# Patient Record
Sex: MALE | Race: WHITE | NOT HISPANIC OR LATINO | URBAN - METROPOLITAN AREA
[De-identification: names, ages, dates, MRNs, and addresses within clinical notes are randomized per-mention and may not be internally consistent; named-entity substitution may affect disease eponyms.]

---

## 2018-12-19 ENCOUNTER — OUTPATIENT (OUTPATIENT)
Dept: OUTPATIENT SERVICES | Facility: HOSPITAL | Age: 63
LOS: 1 days | Discharge: HOME | End: 2018-12-19
Payer: COMMERCIAL

## 2018-12-19 DIAGNOSIS — S91.309A UNSPECIFIED OPEN WOUND, UNSPECIFIED FOOT, INITIAL ENCOUNTER: ICD-10-CM
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
## 2018-12-20 DIAGNOSIS — E66.8 OTHER OBESITY: ICD-10-CM

## 2018-12-20 DIAGNOSIS — I10 ESSENTIAL (PRIMARY) HYPERTENSION: ICD-10-CM
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
## 2018-12-20 DIAGNOSIS — S91.002D UNSPECIFIED OPEN WOUND, LEFT ANKLE, SUBSEQUENT ENCOUNTER: ICD-10-CM
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
## 2018-12-20 DIAGNOSIS — Z79.899 OTHER LONG TERM (CURRENT) DRUG THERAPY: ICD-10-CM

## 2018-12-20 DIAGNOSIS — N40.0 BENIGN PROSTATIC HYPERPLASIA WITHOUT LOWER URINARY TRACT SYMPTOMS: ICD-10-CM
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
## 2018-12-20 DIAGNOSIS — Z83.3 FAMILY HISTORY OF DIABETES MELLITUS: ICD-10-CM

## 2018-12-20 DIAGNOSIS — I48.91 UNSPECIFIED ATRIAL FIBRILLATION: ICD-10-CM

## 2018-12-27 ENCOUNTER — OUTPATIENT (OUTPATIENT)
Dept: OUTPATIENT SERVICES | Facility: HOSPITAL | Age: 63
LOS: 1 days | Discharge: HOME | End: 2018-12-27
Payer: COMMERCIAL

## 2018-12-27 DIAGNOSIS — S91.002D UNSPECIFIED OPEN WOUND, LEFT ANKLE, SUBSEQUENT ENCOUNTER: ICD-10-CM

## 2018-12-27 PROCEDURE — G0463: CPT

## 2018-12-27 PROCEDURE — 87186 SC STD MICRODIL/AGAR DIL: CPT

## 2018-12-27 PROCEDURE — 97602 WOUND(S) CARE NON-SELECTIVE: CPT

## 2018-12-27 PROCEDURE — 87070 CULTURE OTHR SPECIMN AEROBIC: CPT

## 2018-12-27 PROCEDURE — 73630 X-RAY EXAM OF FOOT: CPT

## 2018-12-27 PROCEDURE — 73630 X-RAY EXAM OF FOOT: CPT | Mod: 26,50

## 2018-12-29 LAB
-  AMIKACIN: SIGNIFICANT CHANGE UP
-  AMOXICILLIN/CLAVULANIC ACID: SIGNIFICANT CHANGE UP
-  AMPICILLIN/SULBACTAM: SIGNIFICANT CHANGE UP
-  AMPICILLIN: SIGNIFICANT CHANGE UP
-  AZTREONAM: SIGNIFICANT CHANGE UP
-  CEFAZOLIN: SIGNIFICANT CHANGE UP
-  CEFEPIME: SIGNIFICANT CHANGE UP
-  CEFOXITIN: SIGNIFICANT CHANGE UP
-  CEFTRIAXONE: SIGNIFICANT CHANGE UP
-  CIPROFLOXACIN: SIGNIFICANT CHANGE UP
-  ERTAPENEM: SIGNIFICANT CHANGE UP
-  GENTAMICIN: SIGNIFICANT CHANGE UP
-  IMIPENEM: SIGNIFICANT CHANGE UP
-  LEVOFLOXACIN: SIGNIFICANT CHANGE UP
-  MEROPENEM: SIGNIFICANT CHANGE UP
-  PIPERACILLIN/TAZOBACTAM: SIGNIFICANT CHANGE UP
-  TOBRAMYCIN: SIGNIFICANT CHANGE UP
-  TRIMETHOPRIM/SULFAMETHOXAZOLE: SIGNIFICANT CHANGE UP
METHOD TYPE: SIGNIFICANT CHANGE UP

## 2018-12-30 LAB
-  AMPICILLIN: SIGNIFICANT CHANGE UP
-  TETRACYCLINE: SIGNIFICANT CHANGE UP
-  VANCOMYCIN: SIGNIFICANT CHANGE UP
CULTURE RESULTS: SIGNIFICANT CHANGE UP
METHOD TYPE: SIGNIFICANT CHANGE UP
ORGANISM # SPEC MICROSCOPIC CNT: SIGNIFICANT CHANGE UP
SPECIMEN SOURCE: SIGNIFICANT CHANGE UP

## 2018-12-31 DIAGNOSIS — N40.0 BENIGN PROSTATIC HYPERPLASIA WITHOUT LOWER URINARY TRACT SYMPTOMS: ICD-10-CM
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
## 2018-12-31 DIAGNOSIS — Z83.3 FAMILY HISTORY OF DIABETES MELLITUS: ICD-10-CM
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
## 2018-12-31 DIAGNOSIS — E66.8 OTHER OBESITY: ICD-10-CM

## 2018-12-31 DIAGNOSIS — I48.91 UNSPECIFIED ATRIAL FIBRILLATION: ICD-10-CM

## 2018-12-31 DIAGNOSIS — Z79.899 OTHER LONG TERM (CURRENT) DRUG THERAPY: ICD-10-CM

## 2018-12-31 DIAGNOSIS — S91.002D UNSPECIFIED OPEN WOUND, LEFT ANKLE, SUBSEQUENT ENCOUNTER: ICD-10-CM

## 2018-12-31 DIAGNOSIS — G62.9 POLYNEUROPATHY, UNSPECIFIED: ICD-10-CM
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
## 2018-12-31 DIAGNOSIS — I10 ESSENTIAL (PRIMARY) HYPERTENSION: ICD-10-CM

## 2019-01-04 ENCOUNTER — OUTPATIENT (OUTPATIENT)
Dept: OUTPATIENT SERVICES | Facility: HOSPITAL | Age: 64
LOS: 1 days | Discharge: HOME | End: 2019-01-04
Payer: COMMERCIAL

## 2019-01-04 DIAGNOSIS — S91.309D UNSPECIFIED OPEN WOUND, UNSPECIFIED FOOT, SUBSEQUENT ENCOUNTER: ICD-10-CM

## 2019-01-04 PROCEDURE — G0463: CPT

## 2019-01-06 DIAGNOSIS — N40.0 BENIGN PROSTATIC HYPERPLASIA WITHOUT LOWER URINARY TRACT SYMPTOMS: ICD-10-CM

## 2019-01-06 DIAGNOSIS — E66.8 OTHER OBESITY: ICD-10-CM
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
## 2019-01-06 DIAGNOSIS — S91.002D UNSPECIFIED OPEN WOUND, LEFT ANKLE, SUBSEQUENT ENCOUNTER: ICD-10-CM

## 2019-01-06 DIAGNOSIS — I48.91 UNSPECIFIED ATRIAL FIBRILLATION: ICD-10-CM

## 2019-01-06 DIAGNOSIS — G62.9 POLYNEUROPATHY, UNSPECIFIED: ICD-10-CM

## 2019-01-06 DIAGNOSIS — Z79.899 OTHER LONG TERM (CURRENT) DRUG THERAPY: ICD-10-CM

## 2019-01-06 DIAGNOSIS — I10 ESSENTIAL (PRIMARY) HYPERTENSION: ICD-10-CM
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
## 2019-01-06 DIAGNOSIS — Z83.3 FAMILY HISTORY OF DIABETES MELLITUS: ICD-10-CM
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
## 2019-01-07 ENCOUNTER — OUTPATIENT (OUTPATIENT)
Dept: OUTPATIENT SERVICES | Facility: HOSPITAL | Age: 64
LOS: 1 days | End: 2019-01-07
Payer: COMMERCIAL

## 2019-01-07 DIAGNOSIS — S91.002D UNSPECIFIED OPEN WOUND, LEFT ANKLE, SUBSEQUENT ENCOUNTER: ICD-10-CM

## 2019-01-07 DIAGNOSIS — E11.621 TYPE 2 DIABETES MELLITUS WITH FOOT ULCER: ICD-10-CM

## 2019-01-07 PROCEDURE — 93923 UPR/LXTR ART STDY 3+ LVLS: CPT | Mod: 26

## 2019-01-07 PROCEDURE — 93923 UPR/LXTR ART STDY 3+ LVLS: CPT

## 2019-01-14 ENCOUNTER — OUTPATIENT (OUTPATIENT)
Dept: OUTPATIENT SERVICES | Facility: HOSPITAL | Age: 64
LOS: 1 days | Discharge: HOME | End: 2019-01-14
Payer: COMMERCIAL

## 2019-01-14 DIAGNOSIS — S91.002D UNSPECIFIED OPEN WOUND, LEFT ANKLE, SUBSEQUENT ENCOUNTER: ICD-10-CM

## 2019-01-14 PROCEDURE — G0463: CPT

## 2019-01-15 DIAGNOSIS — Z87.891 PERSONAL HISTORY OF NICOTINE DEPENDENCE: ICD-10-CM

## 2019-01-15 DIAGNOSIS — Z83.3 FAMILY HISTORY OF DIABETES MELLITUS: ICD-10-CM

## 2019-01-15 DIAGNOSIS — G62.9 POLYNEUROPATHY, UNSPECIFIED: ICD-10-CM
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
## 2019-01-15 DIAGNOSIS — Z79.899 OTHER LONG TERM (CURRENT) DRUG THERAPY: ICD-10-CM

## 2019-01-15 DIAGNOSIS — I48.91 UNSPECIFIED ATRIAL FIBRILLATION: ICD-10-CM
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
## 2019-01-15 DIAGNOSIS — I10 ESSENTIAL (PRIMARY) HYPERTENSION: ICD-10-CM

## 2019-01-15 DIAGNOSIS — I73.9 PERIPHERAL VASCULAR DISEASE, UNSPECIFIED: ICD-10-CM

## 2019-01-15 DIAGNOSIS — S91.002D UNSPECIFIED OPEN WOUND, LEFT ANKLE, SUBSEQUENT ENCOUNTER: ICD-10-CM

## 2019-01-15 DIAGNOSIS — E66.8 OTHER OBESITY: ICD-10-CM
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
## 2019-02-01 ENCOUNTER — OUTPATIENT (OUTPATIENT)
Dept: OUTPATIENT SERVICES | Facility: HOSPITAL | Age: 64
LOS: 1 days | Discharge: HOME | End: 2019-02-01
Payer: COMMERCIAL

## 2019-02-01 DIAGNOSIS — E11.69 TYPE 2 DIABETES MELLITUS WITH OTHER SPECIFIED COMPLICATION: ICD-10-CM

## 2019-02-01 DIAGNOSIS — S91.002D UNSPECIFIED OPEN WOUND, LEFT ANKLE, SUBSEQUENT ENCOUNTER: ICD-10-CM

## 2019-02-01 PROCEDURE — G0463: CPT

## 2019-02-02 DIAGNOSIS — Z79.899 OTHER LONG TERM (CURRENT) DRUG THERAPY: ICD-10-CM

## 2019-02-02 DIAGNOSIS — I48.91 UNSPECIFIED ATRIAL FIBRILLATION: ICD-10-CM
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
## 2019-02-02 DIAGNOSIS — N40.0 BENIGN PROSTATIC HYPERPLASIA WITHOUT LOWER URINARY TRACT SYMPTOMS: ICD-10-CM

## 2019-02-02 DIAGNOSIS — S91.002D UNSPECIFIED OPEN WOUND, LEFT ANKLE, SUBSEQUENT ENCOUNTER: ICD-10-CM
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
## 2019-02-02 DIAGNOSIS — I73.9 PERIPHERAL VASCULAR DISEASE, UNSPECIFIED: ICD-10-CM

## 2019-02-02 DIAGNOSIS — G62.9 POLYNEUROPATHY, UNSPECIFIED: ICD-10-CM

## 2019-02-02 DIAGNOSIS — I10 ESSENTIAL (PRIMARY) HYPERTENSION: ICD-10-CM

## 2019-02-02 DIAGNOSIS — Z83.3 FAMILY HISTORY OF DIABETES MELLITUS: ICD-10-CM

## 2019-02-02 DIAGNOSIS — Z87.891 PERSONAL HISTORY OF NICOTINE DEPENDENCE: ICD-10-CM

## 2019-02-02 DIAGNOSIS — E66.8 OTHER OBESITY: ICD-10-CM
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
## 2019-10-13 PROBLEM — Z00.00 ENCOUNTER FOR PREVENTIVE HEALTH EXAMINATION: Status: ACTIVE | Noted: 2019-10-13

## 2019-10-14 ENCOUNTER — APPOINTMENT (OUTPATIENT)
Dept: PODIATRY | Facility: HOSPITAL | Age: 64
End: 2019-10-14

## 2019-10-15 ENCOUNTER — OUTPATIENT (OUTPATIENT)
Dept: OUTPATIENT SERVICES | Facility: HOSPITAL | Age: 64
LOS: 1 days | Discharge: HOME | End: 2019-10-15
Payer: COMMERCIAL

## 2019-10-15 ENCOUNTER — APPOINTMENT (OUTPATIENT)
Dept: PODIATRY | Facility: HOSPITAL | Age: 64
End: 2019-10-15

## 2019-10-15 ENCOUNTER — APPOINTMENT (OUTPATIENT)
Dept: SURGERY | Facility: HOSPITAL | Age: 64
End: 2019-10-15
Payer: COMMERCIAL

## 2019-10-15 VITALS
WEIGHT: 209 LBS | HEART RATE: 57 BPM | HEIGHT: 69 IN | DIASTOLIC BLOOD PRESSURE: 75 MMHG | BODY MASS INDEX: 30.96 KG/M2 | RESPIRATION RATE: 20 BRPM | SYSTOLIC BLOOD PRESSURE: 123 MMHG | OXYGEN SATURATION: 97 % | TEMPERATURE: 97.6 F

## 2019-10-15 DIAGNOSIS — Z86.79 PERSONAL HISTORY OF OTHER DISEASES OF THE CIRCULATORY SYSTEM: ICD-10-CM

## 2019-10-15 DIAGNOSIS — Z87.438 PERSONAL HISTORY OF OTHER DISEASES OF MALE GENITAL ORGANS: ICD-10-CM

## 2019-10-15 DIAGNOSIS — L97.801 NON-PRESSURE CHRONIC ULCER OF OTHER PART OF UNSPECIFIED LOWER LEG LIMITED TO BREAKDOWN OF SKIN: ICD-10-CM

## 2019-10-15 DIAGNOSIS — I25.10 ATHEROSCLEROTIC HEART DISEASE OF NATIVE CORONARY ARTERY W/OUT ANGINA PECTORIS: ICD-10-CM

## 2019-10-15 DIAGNOSIS — Z87.891 PERSONAL HISTORY OF NICOTINE DEPENDENCE: ICD-10-CM

## 2019-10-15 PROCEDURE — 99214 OFFICE O/P EST MOD 30 MIN: CPT

## 2019-10-15 PROCEDURE — G0463: CPT

## 2019-10-15 RX ORDER — OLMESARTAN MEDOXOMIL 40 MG/1
40 TABLET, FILM COATED ORAL DAILY
Refills: 0 | Status: ACTIVE | COMMUNITY

## 2019-10-15 RX ORDER — HYDRALAZINE HYDROCHLORIDE 25 MG/1
25 TABLET ORAL 3 TIMES DAILY
Refills: 0 | Status: ACTIVE | COMMUNITY

## 2019-10-15 RX ORDER — TAMSULOSIN HYDROCHLORIDE 0.4 MG/1
0.4 CAPSULE ORAL
Refills: 0 | Status: ACTIVE | COMMUNITY

## 2019-10-15 NOTE — PHYSICAL EXAM
[JVD] : no jugular venous distention  [Normal Breath Sounds] : Normal breath sounds [Normal Heart Sounds] : normal heart sounds [2+] : left 2+ [1+] : left 1+ [Ankle Swelling (On Exam)] : not present [Ankle Swelling Bilaterally] : bilaterally  [Varicose Veins Of Lower Extremities] : bilaterally [Alert] : alert [Oriented to Person] : oriented to person [] : bilaterally [Calm] : calm [de-identified] : WD/WN in no acute distress. [de-identified] : WNL [de-identified] : WNL [de-identified] : Left leg laceration with red base, no drainage, no infection, periwound is intact, no cellulitis.  [de-identified] : KAMRANL [FreeTextEntry1] : Posterior Leg  [FreeTextEntry2] : 0.5 [de-identified] : mild [FreeTextEntry4] : 0.1 [FreeTextEntry3] : 0.7 [de-identified] : Cleansed with Normal saline\par  [de-identified] : Silver Alginate [de-identified] : CIRCULATION \par \par Dorsalis pedis:  2+, bilaterally\par Posterior Tibialis:  2+, bilaterally \par Doppler Pulses:  Present, bilaterally \par Extremity Color: Pink, bilaterally \par Extremity Temperature: Warm, bilaterally \par Capillary Refill:  <3sec, bilaterally \par MARIANNE: MD ordered vascular studies\par  [TWNoteComboBox1] : Left [TWNoteComboBox5] : No [TWNoteComboBox4] : Small [de-identified] : Erythema [de-identified] : No [TWNoteComboBox6] : Other [de-identified] : 100% [de-identified] : No [de-identified] : None [de-identified] : Primary Dressing [de-identified] : 3x Weekly

## 2019-10-15 NOTE — HISTORY OF PRESENT ILLNESS
[FreeTextEntry1] : 64 year old male presents to the [facility] with a left posterior leg wound. The patient reports that he noticed the wound 6 weeks ago. He followed up with his internist who prescribed 2 (10) day doses of Levaquin. He then decided to follow up with the [facility] for care as he was previously a patient. \par \par \par Left leg with three weeks old non-healing laceration, no C/O

## 2019-10-15 NOTE — ASSESSMENT
[] : No [FreeTextEntry2] : Infection Prevention\par Local Wound Care\par Vascular studies\par F/U 1 week  [FreeTextEntry3] : Initial visit  [FreeTextEntry1] : Three weeks old left leg laceration, no infection. [FreeTextEntry4] : MD ordered vascular studies, authorization submitted today.\par Patient left [facility] without prescription for vascular studies, a voicemail was left making the patient aware, a copy will be in patient's file, for his next [facility] visit. \par F/U 1 week

## 2019-10-16 DIAGNOSIS — I11.9 HYPERTENSIVE HEART DISEASE WITHOUT HEART FAILURE: ICD-10-CM

## 2019-10-16 DIAGNOSIS — Z83.3 FAMILY HISTORY OF DIABETES MELLITUS: ICD-10-CM

## 2019-10-16 DIAGNOSIS — Z87.891 PERSONAL HISTORY OF NICOTINE DEPENDENCE: ICD-10-CM

## 2019-10-16 DIAGNOSIS — S81.812D LACERATION WITHOUT FOREIGN BODY, LEFT LOWER LEG, SUBSEQUENT ENCOUNTER: ICD-10-CM
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
## 2019-10-16 DIAGNOSIS — I48.91 UNSPECIFIED ATRIAL FIBRILLATION: ICD-10-CM
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
## 2019-10-16 DIAGNOSIS — Z79.899 OTHER LONG TERM (CURRENT) DRUG THERAPY: ICD-10-CM

## 2019-10-16 DIAGNOSIS — N40.0 BENIGN PROSTATIC HYPERPLASIA WITHOUT LOWER URINARY TRACT SYMPTOMS: ICD-10-CM

## 2019-10-24 ENCOUNTER — OUTPATIENT (OUTPATIENT)
Dept: OUTPATIENT SERVICES | Facility: HOSPITAL | Age: 64
LOS: 1 days | Discharge: HOME | End: 2019-10-24
Payer: COMMERCIAL

## 2019-10-24 ENCOUNTER — APPOINTMENT (OUTPATIENT)
Dept: PODIATRY | Facility: HOSPITAL | Age: 64
End: 2019-10-24

## 2019-10-24 ENCOUNTER — APPOINTMENT (OUTPATIENT)
Dept: SURGERY | Facility: HOSPITAL | Age: 64
End: 2019-10-24
Payer: COMMERCIAL

## 2019-10-24 VITALS
OXYGEN SATURATION: 97 % | TEMPERATURE: 97 F | HEART RATE: 56 BPM | SYSTOLIC BLOOD PRESSURE: 138 MMHG | RESPIRATION RATE: 20 BRPM | BODY MASS INDEX: 30.96 KG/M2 | DIASTOLIC BLOOD PRESSURE: 78 MMHG | WEIGHT: 209 LBS | HEIGHT: 69 IN

## 2019-10-24 DIAGNOSIS — L97.801 NON-PRESSURE CHRONIC ULCER OF OTHER PART OF UNSPECIFIED LOWER LEG LIMITED TO BREAKDOWN OF SKIN: ICD-10-CM

## 2019-10-24 PROCEDURE — 99213 OFFICE O/P EST LOW 20 MIN: CPT

## 2019-10-24 PROCEDURE — G0463: CPT

## 2019-10-24 NOTE — ASSESSMENT
[] : No [FreeTextEntry4] : Pt Discharged from [facility]  [FreeTextEntry2] : Restore Optimal Skin Integrity \par  [FreeTextEntry1] : Left leg laceration has fully closed.

## 2019-10-24 NOTE — PHYSICAL EXAM
[JVD] : no jugular venous distention  [Normal Breath Sounds] : Normal breath sounds [Normal Heart Sounds] : normal heart sounds [2+] : left 2+ [1+] : left 1+ [Ankle Swelling (On Exam)] : not present [Ankle Swelling Bilaterally] : bilaterally  [Varicose Veins Of Lower Extremities] : bilaterally [] : bilaterally [Alert] : alert [Oriented to Person] : oriented to person [Calm] : calm [de-identified] : WD/WN in no acute distress. [de-identified] : WNL [de-identified] : KAMRANL [de-identified] : WNL [de-identified] : Left leg laceration has fully healed, covered with dry scab, no infection, no drainage.  [FreeTextEntry1] : Posterior Leg [FreeTextEntry3] : 0.6 [FreeTextEntry2] : 0.4 [FreeTextEntry4] : dry eschar [de-identified] : Cindy Deng  [de-identified] : Intact dry skin [de-identified] : Cleansed with Normal Saline.  [TWNoteComboBox1] : Left [TWNoteComboBox4] : None [TWNoteComboBox6] : Traumatic [de-identified] : other [de-identified] : 3x Weekly [de-identified] : None

## 2019-10-27 DIAGNOSIS — Z79.899 OTHER LONG TERM (CURRENT) DRUG THERAPY: ICD-10-CM

## 2019-10-27 DIAGNOSIS — S81.812D LACERATION WITHOUT FOREIGN BODY, LEFT LOWER LEG, SUBSEQUENT ENCOUNTER: ICD-10-CM
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
## 2019-10-27 DIAGNOSIS — I48.91 UNSPECIFIED ATRIAL FIBRILLATION: ICD-10-CM

## 2019-10-27 DIAGNOSIS — Z87.891 PERSONAL HISTORY OF NICOTINE DEPENDENCE: ICD-10-CM
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
## 2019-10-27 DIAGNOSIS — N40.0 BENIGN PROSTATIC HYPERPLASIA WITHOUT LOWER URINARY TRACT SYMPTOMS: ICD-10-CM
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
## 2019-10-27 DIAGNOSIS — Z83.3 FAMILY HISTORY OF DIABETES MELLITUS: ICD-10-CM
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
## 2019-10-27 DIAGNOSIS — I11.9 HYPERTENSIVE HEART DISEASE WITHOUT HEART FAILURE: ICD-10-CM

## 2019-11-04 ENCOUNTER — FORM ENCOUNTER (OUTPATIENT)
Age: 64
End: 2019-11-04

## 2019-11-05 ENCOUNTER — OUTPATIENT (OUTPATIENT)
Dept: OUTPATIENT SERVICES | Facility: HOSPITAL | Age: 64
LOS: 1 days | End: 2019-11-05
Payer: COMMERCIAL

## 2019-11-05 DIAGNOSIS — Z00.00 ENCOUNTER FOR GENERAL ADULT MEDICAL EXAMINATION WITHOUT ABNORMAL FINDINGS: ICD-10-CM

## 2019-11-05 PROCEDURE — 93923 UPR/LXTR ART STDY 3+ LVLS: CPT | Mod: 26

## 2019-11-05 PROCEDURE — 93923 UPR/LXTR ART STDY 3+ LVLS: CPT

## 2019-11-21 ENCOUNTER — OUTPATIENT (OUTPATIENT)
Dept: OUTPATIENT SERVICES | Facility: HOSPITAL | Age: 64
LOS: 1 days | Discharge: HOME | End: 2019-11-21
Payer: COMMERCIAL

## 2019-11-21 ENCOUNTER — APPOINTMENT (OUTPATIENT)
Dept: WOUND CARE | Facility: HOSPITAL | Age: 64
End: 2019-11-21
Payer: COMMERCIAL

## 2019-11-21 VITALS
TEMPERATURE: 97.2 F | SYSTOLIC BLOOD PRESSURE: 146 MMHG | BODY MASS INDEX: 30.96 KG/M2 | WEIGHT: 209 LBS | HEART RATE: 78 BPM | OXYGEN SATURATION: 97 % | RESPIRATION RATE: 20 BRPM | DIASTOLIC BLOOD PRESSURE: 85 MMHG | HEIGHT: 69 IN

## 2019-11-21 DIAGNOSIS — L97.801 NON-PRESSURE CHRONIC ULCER OF OTHER PART OF UNSPECIFIED LOWER LEG LIMITED TO BREAKDOWN OF SKIN: ICD-10-CM

## 2019-11-21 PROCEDURE — G0463: CPT

## 2019-11-21 PROCEDURE — 99213 OFFICE O/P EST LOW 20 MIN: CPT

## 2019-11-21 RX ORDER — AMLODIPINE BESYLATE 5 MG/1
5 TABLET ORAL
Refills: 0 | Status: DISCONTINUED | COMMUNITY
End: 2019-11-21

## 2019-11-21 NOTE — HISTORY OF PRESENT ILLNESS
[FreeTextEntry1] : 64 year old male presents to the [facility] with a left posterior leg wound. The patient was previously a patient at the [facility]. He was treated for this wound and discharged on 10/24/19. He has returned with dry eschar on the wound and reports the wound is painful and the eschar has not fallen off.

## 2019-11-21 NOTE — PHYSICAL EXAM
[4 x 4] : 4 x 4  [Normal Breath Sounds] : Normal breath sounds [Normal Heart Sounds] : normal heart sounds [2+] : left 2+ [1+] : left 1+ [Ankle Swelling Bilaterally] : bilaterally  [Skin Ulcer] : ulcer [Alert] : alert [Oriented to Person] : oriented to person [Oriented to Place] : oriented to place [Oriented to Time] : oriented to time [Calm] : calm [JVD] : no jugular venous distention  [Ankle Swelling (On Exam)] : not present [Varicose Veins Of Lower Extremities] : not present [] : not present [Abdomen Tenderness] : ~T ~M No abdominal tenderness [Purpura] : no purpura  [Petechiae] : no petechiae [Skin Induration] : no induration [de-identified] : WD/WNWM in no acute distress. [de-identified] : WNL [de-identified] : KAMRANL [de-identified] : Adherent scab at wound site left posterior calf. No drainage, no cellulitis, no induration, no fluctuance. Scab cannot be lifterd off. Slight tenderness locally [FreeTextEntry1] : Posterior Leg- dry eschar  [FreeTextEntry2] : 0.3 [FreeTextEntry3] : 0.5 [FreeTextEntry4] : 0.1 [de-identified] : Silvadene [de-identified] : Cleansed with Normal saline\par  [de-identified] : CIRCULATION \par \par Dorsalis pedis:  2+, bilaterally \par Posterior Tibialis:  2+, bilaterally \par Doppler Pulses: Present, bilaterally \par Extremity Color: Pink, bilaterally \par Extremity Temperature: Warm, bilaterally \par Capillary Refill:  <3sec, bilaterally \par MARIANNE: Vascular studies completed at previous [facility] visit, before discharge. \par  [TWNoteComboBox1] : Left [de-identified] : 2x Daily [de-identified] : Primary Dressing

## 2019-11-21 NOTE — ASSESSMENT
[Verbal] : Verbal [Patient] : Patient [Fair - mild discomfort, physical impairment, low acceptance] : Fair - mild discomfort, physical impairment, low acceptance [Verbalizes knowledge/Understanding] : Verbalizes knowledge/understanding [Dressing changes] : dressing changes [Signs and symptoms of infection] : sign and symptoms of infection [How and When to Call] : how and when to call [Patient responsibility to plan of care] : patient responsibility to plan of care [Stable] : stable [Home] : Home [Ambulatory] : Ambulatory [Not Applicable - Long Term Care/Home Health Agency] : Long Term Care/Home Health Agency: Not Applicable [] : No [FreeTextEntry2] : Infection Prevention\par Restore Optimal Skin Integrity\par F/U 2 weeks  [FreeTextEntry3] : Initial visit  [FreeTextEntry4] : F/U 2 weeks

## 2019-11-23 DIAGNOSIS — Z83.3 FAMILY HISTORY OF DIABETES MELLITUS: ICD-10-CM

## 2019-11-23 DIAGNOSIS — N40.0 BENIGN PROSTATIC HYPERPLASIA WITHOUT LOWER URINARY TRACT SYMPTOMS: ICD-10-CM
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
## 2019-11-23 DIAGNOSIS — I11.9 HYPERTENSIVE HEART DISEASE WITHOUT HEART FAILURE: ICD-10-CM

## 2019-11-23 DIAGNOSIS — Z87.891 PERSONAL HISTORY OF NICOTINE DEPENDENCE: ICD-10-CM

## 2019-11-23 DIAGNOSIS — Z79.899 OTHER LONG TERM (CURRENT) DRUG THERAPY: ICD-10-CM

## 2019-11-23 DIAGNOSIS — S81.812D LACERATION WITHOUT FOREIGN BODY, LEFT LOWER LEG, SUBSEQUENT ENCOUNTER: ICD-10-CM
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
## 2019-11-23 DIAGNOSIS — I48.91 UNSPECIFIED ATRIAL FIBRILLATION: ICD-10-CM

## 2019-12-05 ENCOUNTER — APPOINTMENT (OUTPATIENT)
Dept: SURGERY | Facility: HOSPITAL | Age: 64
End: 2019-12-05

## 2020-05-20 NOTE — VITALS
Call Palliative are for any questions, concerns, new/worsening symptoms, or decline in health.    Reviewed plan and recommendations.    Left Palliative care office instructions with patient.    Call 911/ go to nearest ED for emergencies.     [] : No [FreeTextEntry3] : Left Leg [FreeTextEntry4] : Tylenol [FreeTextEntry5] : lowered amlodipine dosage

## 2022-09-20 ENCOUNTER — APPOINTMENT (OUTPATIENT)
Dept: WOUND CARE | Facility: HOSPITAL | Age: 67
End: 2022-09-20

## 2022-09-20 ENCOUNTER — OUTPATIENT (OUTPATIENT)
Dept: OUTPATIENT SERVICES | Facility: HOSPITAL | Age: 67
LOS: 1 days | Discharge: HOME | End: 2022-09-20
Payer: MEDICARE

## 2022-09-20 VITALS
HEART RATE: 60 BPM | RESPIRATION RATE: 20 BRPM | BODY MASS INDEX: 30.96 KG/M2 | OXYGEN SATURATION: 94 % | TEMPERATURE: 97.9 F | SYSTOLIC BLOOD PRESSURE: 191 MMHG | HEIGHT: 69 IN | DIASTOLIC BLOOD PRESSURE: 96 MMHG | WEIGHT: 209 LBS

## 2022-09-20 DIAGNOSIS — N40.0 BENIGN PROSTATIC HYPERPLASIA WITHOUT LOWER URINARY TRACT SYMPTOMS: ICD-10-CM

## 2022-09-20 DIAGNOSIS — S81.811D LACERATION WITHOUT FOREIGN BODY, RIGHT LOWER LEG, SUBSEQUENT ENCOUNTER: ICD-10-CM
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
## 2022-09-20 DIAGNOSIS — Z95.1 PRESENCE OF AORTOCORONARY BYPASS GRAFT: ICD-10-CM

## 2022-09-20 DIAGNOSIS — Z79.899 OTHER LONG TERM (CURRENT) DRUG THERAPY: ICD-10-CM

## 2022-09-20 DIAGNOSIS — L97.801 NON-PRESSURE CHRONIC ULCER OF OTHER PART OF UNSPECIFIED LOWER LEG LIMITED TO BREAKDOWN OF SKIN: ICD-10-CM

## 2022-09-20 DIAGNOSIS — I48.91 UNSPECIFIED ATRIAL FIBRILLATION: ICD-10-CM
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
## 2022-09-20 DIAGNOSIS — Z98.890 OTHER SPECIFIED POSTPROCEDURAL STATES: ICD-10-CM

## 2022-09-20 DIAGNOSIS — Z87.891 PERSONAL HISTORY OF NICOTINE DEPENDENCE: ICD-10-CM
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
## 2022-09-20 DIAGNOSIS — I11.9 HYPERTENSIVE HEART DISEASE WITHOUT HEART FAILURE: ICD-10-CM

## 2022-09-20 DIAGNOSIS — Z83.3 FAMILY HISTORY OF DIABETES MELLITUS: ICD-10-CM

## 2022-09-20 PROCEDURE — 99214 OFFICE O/P EST MOD 30 MIN: CPT

## 2022-09-20 PROCEDURE — G0463: CPT

## 2022-09-20 RX ORDER — ACETAMINOPHEN 500 MG
500 TABLET ORAL 4 TIMES DAILY
Refills: 0 | Status: ACTIVE | COMMUNITY

## 2022-09-20 RX ORDER — SILVER SULFADIAZINE 10 MG/G
1 CREAM TOPICAL DAILY
Qty: 2 | Refills: 2 | Status: ACTIVE | COMMUNITY
Start: 2022-09-20 | End: 1900-01-01

## 2022-09-20 RX ORDER — SILVER SULFADIAZINE 10 MG/G
1 CREAM TOPICAL TWICE DAILY
Qty: 1 | Refills: 1 | Status: DISCONTINUED | COMMUNITY
Start: 2019-11-21 | End: 2022-09-20

## 2022-09-20 NOTE — REVIEW OF SYSTEMS
[Negative] : Heme/Lymph [FreeTextEntry5] : hypertension, CAD ,AFib [FreeTextEntry8] : BPH [de-identified] : wound right calf

## 2022-09-20 NOTE — PHYSICAL EXAM
[Normal Breath Sounds] : Normal breath sounds [Normal Rate and Rhythm] : normal rate and rhythm [0] : left 0 [2+] : left 2+ [Ankle Swelling (On Exam)] : present [Ankle Swelling Bilaterally] : bilaterally  [Ankle Swelling On The Left] : moderate [Alert] : alert [Oriented to Person] : oriented to person [Oriented to Place] : oriented to place [Oriented to Time] : oriented to time [Calm] : calm [2 x 2] : 2 x 2  [Varicose Veins Of Lower Extremities] : not present [] : not present [de-identified] : WN WD 66 Y/O white male in NAD [de-identified] : TRE PRASADM intact [de-identified] : supple [de-identified] : 2 small residual scabs/eschar right calf [FreeTextEntry1] : Right lower posterior leg - 2 small wounds within measurement [FreeTextEntry2] : 1.9 [FreeTextEntry3] : 0.3 [FreeTextEntry4] : dry eschar [de-identified] : none [de-identified] : mild erythema [de-identified] : none [de-identified] : Silvadene [de-identified] : Mechanically cleansed with normal saline solution and sterile gauze,\par  [de-identified] : Daily

## 2022-09-20 NOTE — PLAN
[FreeTextEntry1] : patient going for doppler today to R/O DVT\par negative homans sign and on anticoagulation\par silvadene creme QD DD right posterior callf\par return 1 week\par stressed leg elevation\par will start compression pending doppler results\par 30 minutes spent in review,evaluation and treatment planning

## 2022-09-20 NOTE — VITALS
[Aching] : aching [Continuous] : continuous [FreeTextEntry3] : Right leg wound  (pain 6/10) [FreeTextEntry1] : Tylenol  (brings pain to 3/10) [FreeTextEntry2] : walking [FreeTextEntry4] : Tylenol

## 2022-09-20 NOTE — ASSESSMENT
[Verbal] : Verbal [Written] : Written [Demo] : Demo [Patient] : Patient [Spouse] : Spouse [Good - alert, interested, motivated] : Good - alert, interested, motivated [Verbalizes knowledge/Understanding] : Verbalizes knowledge/understanding [Dressing changes] : dressing changes [Skin Care] : skin care [Signs and symptoms of infection] : sign and symptoms of infection [Nutrition] : nutrition [How and When to Call] : how and when to call [Labs and Tests] : labs and tests [Pain Management] : pain management [Patient responsibility to plan of care] : patient responsibility to plan of care [] : Yes [Stable] : stable [Home] : Home [Ambulatory] : Ambulatory [Not Applicable - Long Term Care/Home Health Agency] : Long Term Care/Home Health Agency: Not Applicable [FreeTextEntry2] : Promote optimal skin integrity, infection prevention, edema control, diagnostic testing\par  [FreeTextEntry4] : \par Circulation:\par \par Dorsalis Pedis:  bilateral palpable\par Posterior Tibialis:  bilateral  unable to palpate\par Doppler Pulses:  bilateral  present \par Extremity Color:  bilateral pigmented\par Extremity Temperature:  bilateral warm \par Capillary Refill:  bilateral <3 sec\par \par \par Non-invasive arterial vascular studies not ordered today as per MD.  Patient with palpable dorsal pedal pulses.\par Non-invasive venous studies ordered submitted for authorization\par Patient's PCP scheduled doppler to be done today at [facility] to r/o DVT.  MD aware.\par \par \par Silvadene escribed\par \par F/U 1 week\par \par \par \par \par

## 2022-09-20 NOTE — HISTORY OF PRESENT ILLNESS
[FreeTextEntry1] : Patient presents with a traumatic wound on his right lower posterior leg.  Patient reports that in mid August 2022 he was on a baseball field and  his leg became itchy from gnat bites.  Patient scratched the area causing a wound.  Patient was seen by his PCP (8/22/22) and was prescribed oral antibiotic.  Patient reports that he completed the antibiotic and when he returned to PCP  (9/1/22) he was prescribed a second antibiotic which he completed. Patient does not recall the name.   Patient reports that he is not applying topical medication or dressing to his wound and was advised warm compresses as ordered by his PCP   Patient presents with right lower leg edema and reports that he went to PCP office yesterday and was given a script for doppler to r/o DVT.  Patient has an appointment to have doppler at [facility] this afternoon.  Patient was a patient at [facility] in 2019 and decided to return to [facility] for treatment of wound.

## 2022-09-27 ENCOUNTER — OUTPATIENT (OUTPATIENT)
Dept: OUTPATIENT SERVICES | Facility: HOSPITAL | Age: 67
LOS: 1 days | Discharge: HOME | End: 2022-09-27
Payer: MEDICARE

## 2022-09-27 ENCOUNTER — APPOINTMENT (OUTPATIENT)
Dept: WOUND CARE | Facility: HOSPITAL | Age: 67
End: 2022-09-27

## 2022-09-27 VITALS
RESPIRATION RATE: 20 BRPM | HEIGHT: 69 IN | WEIGHT: 209 LBS | HEART RATE: 61 BPM | DIASTOLIC BLOOD PRESSURE: 81 MMHG | OXYGEN SATURATION: 95 % | SYSTOLIC BLOOD PRESSURE: 162 MMHG | BODY MASS INDEX: 30.96 KG/M2 | TEMPERATURE: 97.8 F

## 2022-09-27 DIAGNOSIS — L97.801 NON-PRESSURE CHRONIC ULCER OF OTHER PART OF UNSPECIFIED LOWER LEG LIMITED TO BREAKDOWN OF SKIN: ICD-10-CM

## 2022-09-27 PROCEDURE — G0463: CPT

## 2022-09-27 PROCEDURE — 99213 OFFICE O/P EST LOW 20 MIN: CPT

## 2022-09-27 NOTE — ASSESSMENT
[Verbal] : Verbal [Written] : Written [Demo] : Demo [Patient] : Patient [Spouse] : Spouse [Good - alert, interested, motivated] : Good - alert, interested, motivated [Verbalizes knowledge/Understanding] : Verbalizes knowledge/understanding [Dressing changes] : dressing changes [Skin Care] : skin care [Signs and symptoms of infection] : sign and symptoms of infection [Nutrition] : nutrition [How and When to Call] : how and when to call [Labs and Tests] : labs and tests [Pain Management] : pain management [Patient responsibility to plan of care] : patient responsibility to plan of care [] : Yes [Stable] : stable [Home] : Home [Ambulatory] : Ambulatory [Not Applicable - Long Term Care/Home Health Agency] : Long Term Care/Home Health Agency: Not Applicable [FreeTextEntry2] : Promote optimal skin integrity, infection prevention\par  [FreeTextEntry4] : \par Patient has not scheduled non-invasive venous studies to date.\par Patient reports that he had venous doppler at [facility] and it was negative for DVT.  MD aware.\par Mupirocin escribed.\par F/U 1 week\par \par \par \par Silvadene escribed\par \par F/U 1 week\par \par \par \par \par

## 2022-09-27 NOTE — PLAN
[FreeTextEntry1] : patient went for DVT doppler last week that he states was negative\par both eschars off one small spot close other small opened area\par bactroban QD with DD started\par not sign of infection noted\par return 2 weeks\par stressed leg elevatio\par 20 minutes spent in review,evaluation and treatment planning

## 2022-09-27 NOTE — PHYSICAL EXAM
[2 x 2] : 2 x 2  [Normal Breath Sounds] : Normal breath sounds [Normal Rate and Rhythm] : normal rate and rhythm [0] : left 0 [2+] : left 2+ [Ankle Swelling (On Exam)] : present [Ankle Swelling Bilaterally] : bilaterally  [Ankle Swelling On The Left] : moderate [Alert] : alert [Oriented to Person] : oriented to person [Oriented to Place] : oriented to place [Oriented to Time] : oriented to time [Calm] : calm [Varicose Veins Of Lower Extremities] : not present [] : not present [de-identified] : WN WD 66 Y/O white male in NAD [de-identified] : TRE PRASADM intact [de-identified] : supple [de-identified] : 2 small residual scabs/eschar right calf [de-identified] : MD lifted dry eschar revealing a small open area. [FreeTextEntry1] : Right lower posterior leg - 2 small wounds within measurement [FreeTextEntry2] : 0.7 [FreeTextEntry3] : 0.7 [FreeTextEntry4] : dry eschar [de-identified] : none [de-identified] : mild erythema [de-identified] : none [de-identified] : 100% [de-identified] : Mupirocin [de-identified] : Mechanically cleansed with normal saline solution and sterile gauze,\par  [de-identified] : Daily

## 2022-09-27 NOTE — HISTORY OF PRESENT ILLNESS
[FreeTextEntry1] : Patient presents with a traumatic wound on his right lower posterior leg.  Patient reports that in mid August 2022 he was on a baseball field and  his leg became itchy from gnat bites.  Patient scratched the area causing a wound.  Patient was seen by his PCP (8/22/22) and was prescribed oral antibiotic.  Patient reports that he completed the antibiotic and when he returned to PCP  (9/1/22) he was prescribed a second antibiotic which he completed. Patient does not recall the name.   Patient reports that he is not applying topical medication or dressing to his wound and was advised warm compresses as ordered by his PCP   Patient presents with right lower leg edema and reports that he went to PCP office yesterday and was given a script for doppler to r/o DVT.  Patient has an appointment to have doppler at [facility] this afternoon.  Patient was a patient at [facility] in 2019 and decided to return to [facility] for treatment of wound.\par \par 9/27/22 area right posterior calf healed in one spot and eschar off with small opened spot

## 2022-09-27 NOTE — REVIEW OF SYSTEMS
[Negative] : Heme/Lymph [FreeTextEntry5] : hypertension, CAD ,AFib [FreeTextEntry8] : BPH [de-identified] : wound right calf

## 2022-09-28 DIAGNOSIS — I48.91 UNSPECIFIED ATRIAL FIBRILLATION: ICD-10-CM

## 2022-09-28 DIAGNOSIS — S81.811D LACERATION WITHOUT FOREIGN BODY, RIGHT LOWER LEG, SUBSEQUENT ENCOUNTER: ICD-10-CM

## 2022-09-28 DIAGNOSIS — Z95.1 PRESENCE OF AORTOCORONARY BYPASS GRAFT: ICD-10-CM

## 2022-09-28 DIAGNOSIS — Z98.890 OTHER SPECIFIED POSTPROCEDURAL STATES: ICD-10-CM
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
## 2022-09-28 DIAGNOSIS — Z83.3 FAMILY HISTORY OF DIABETES MELLITUS: ICD-10-CM

## 2022-09-28 DIAGNOSIS — N40.0 BENIGN PROSTATIC HYPERPLASIA WITHOUT LOWER URINARY TRACT SYMPTOMS: ICD-10-CM
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
## 2022-09-28 DIAGNOSIS — Z87.891 PERSONAL HISTORY OF NICOTINE DEPENDENCE: ICD-10-CM
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
## 2022-09-28 DIAGNOSIS — Z79.899 OTHER LONG TERM (CURRENT) DRUG THERAPY: ICD-10-CM

## 2022-09-28 DIAGNOSIS — I11.9 HYPERTENSIVE HEART DISEASE WITHOUT HEART FAILURE: ICD-10-CM

## 2022-10-11 ENCOUNTER — OUTPATIENT (OUTPATIENT)
Dept: OUTPATIENT SERVICES | Facility: HOSPITAL | Age: 67
LOS: 1 days | Discharge: HOME | End: 2022-10-11
Payer: MEDICARE

## 2022-10-11 ENCOUNTER — APPOINTMENT (OUTPATIENT)
Dept: WOUND CARE | Facility: HOSPITAL | Age: 67
End: 2022-10-11

## 2022-10-11 VITALS
TEMPERATURE: 98 F | BODY MASS INDEX: 30.96 KG/M2 | SYSTOLIC BLOOD PRESSURE: 185 MMHG | DIASTOLIC BLOOD PRESSURE: 91 MMHG | RESPIRATION RATE: 20 BRPM | HEIGHT: 69 IN | WEIGHT: 209 LBS | OXYGEN SATURATION: 93 % | HEART RATE: 75 BPM

## 2022-10-11 VITALS — DIASTOLIC BLOOD PRESSURE: 81 MMHG | SYSTOLIC BLOOD PRESSURE: 161 MMHG

## 2022-10-11 DIAGNOSIS — L97.801 NON-PRESSURE CHRONIC ULCER OF OTHER PART OF UNSPECIFIED LOWER LEG LIMITED TO BREAKDOWN OF SKIN: ICD-10-CM

## 2022-10-11 PROCEDURE — G0463: CPT

## 2022-10-11 PROCEDURE — 99213 OFFICE O/P EST LOW 20 MIN: CPT

## 2022-10-11 NOTE — REVIEW OF SYSTEMS
[Negative] : Heme/Lymph [FreeTextEntry5] : hypertension, CAD ,AFib [FreeTextEntry8] : BPH [de-identified] : wound right calf

## 2022-10-11 NOTE — PHYSICAL EXAM
[2 x 2] : 2 x 2  [Normal Breath Sounds] : Normal breath sounds [Normal Rate and Rhythm] : normal rate and rhythm [0] : left 0 [2+] : left 2+ [Ankle Swelling (On Exam)] : present [Ankle Swelling Bilaterally] : bilaterally  [Ankle Swelling On The Left] : moderate [Alert] : alert [Oriented to Person] : oriented to person [Oriented to Place] : oriented to place [Oriented to Time] : oriented to time [Calm] : calm [Varicose Veins Of Lower Extremities] : not present [] : not present [de-identified] : WN WD 66 Y/O white male in NAD [de-identified] : TRE PRASADM intact [de-identified] : supple [de-identified] : 2 small residual scabs/eschar right calf [FreeTextEntry1] : Right Posterior Lower Leg [FreeTextEntry2] : 0.8 [FreeTextEntry3] : 0.9 [FreeTextEntry4] : 0.1 [de-identified] : Small serous [de-identified] : none [de-identified] : 70% [de-identified] : 30% [de-identified] : Pt expressed comfort post ACE application. Circ/Neuromuscular functions WNL post application. [de-identified] : Medihoney [de-identified] : Mechanically cleansed with sterile gauze and normal saline 0.9%\par Dry Dressing\par  [TWNoteComboBox5] : No [TWNoteComboBox6] : Other [de-identified] : No [de-identified] : Normal [de-identified] : None [de-identified] : Ace wraps [de-identified] : Every other day [de-identified] : Primary Dressing

## 2022-10-11 NOTE — PLAN
[FreeTextEntry1] : patient went for DVT doppler last week that he states was negative\par [place] and returned with wound larger\par lower leg and ankle edema noted\par wound has slough\par medihoney with DD and ace wraps started\par not sign of infection noted\par return 1 weeks\par stressed leg elevation\par stressed need to schedule venous studies\par 20 minutes spent in review,evaluation and treatment planning

## 2022-10-11 NOTE — HISTORY OF PRESENT ILLNESS
[FreeTextEntry1] : Patient presents with a traumatic wound on his right lower posterior leg.  Patient reports that in mid August 2022 he was on a baseball field and  his leg became itchy from gnat bites.  Patient scratched the area causing a wound.  Patient was seen by his PCP (8/22/22) and was prescribed oral antibiotic.  Patient reports that he completed the antibiotic and when he returned to PCP  (9/1/22) he was prescribed a second antibiotic which he completed. Patient does not recall the name.   Patient reports that he is not applying topical medication or dressing to his wound and was advised warm compresses as ordered by his PCP   Patient presents with right lower leg edema and reports that he went to PCP office yesterday and was given a script for doppler to r/o DVT.  Patient has an appointment to have doppler at [facility] this afternoon.  Patient was a patient at [facility] in 2019 and decided to return to [facility] for treatment of wound.\par \par 9/27/22 area right posterior calf healed in one spot and eschar off with small opened spot\par 10/11/22 wound larger probably  secondary to edema

## 2022-10-11 NOTE — ASSESSMENT
[Verbal] : Verbal [Written] : Written [Demo] : Demo [Patient] : Patient [Spouse] : Spouse [Good - alert, interested, motivated] : Good - alert, interested, motivated [Demonstrates independently] : demonstrates independently [Dressing changes] : dressing changes [Skin Care] : skin care [Signs and symptoms of infection] : sign and symptoms of infection [Venous Disease] : venous disease [Nutrition] : nutrition [How and When to Call] : how and when to call [Labs and Tests] : labs and tests [Off-loading] : off-loading [Compression Therapy] : compression therapy [Patient responsibility to plan of care] : patient responsibility to plan of care [Stable] : stable [Home] : Home [Ambulatory] : Ambulatory [Not Applicable - Long Term Care/Home Health Agency] : Long Term Care/Home Health Agency: Not Applicable [] : No [FreeTextEntry2] : Infection prevention \par Wound care (dressing changes)\par Maintain optimal skin integrity to high pressure areas\par Compression therapy\par Nutrition and wound healing\par Elevation and low sodium compliance.\par Offloading the stress on skin structures and decreasing potential pathologic biomechanical influences.\par Vascular [FreeTextEntry3] : Wound larger [FreeTextEntry4] : Pt to call radiology dept. for vascular testing. Pt and Pt's spouse provided phone number for completion of same.\par Pt to f/u in 1 week

## 2022-10-11 NOTE — VITALS
[Pain related to present condition?] : The patient's  pain is related to present condition. [Occasional] : occasional [] : No [de-identified] : 7.5/10 [FreeTextEntry3] : right posterior leg  [FreeTextEntry1] : Tylenol [FreeTextEntry2] : Ambulation, "standing for long period(s) of time" [FreeTextEntry4] : Pt reports taking Tylenol prior to today's wound visit.

## 2022-10-13 DIAGNOSIS — Z87.891 PERSONAL HISTORY OF NICOTINE DEPENDENCE: ICD-10-CM

## 2022-10-13 DIAGNOSIS — Z83.3 FAMILY HISTORY OF DIABETES MELLITUS: ICD-10-CM

## 2022-10-13 DIAGNOSIS — Z79.899 OTHER LONG TERM (CURRENT) DRUG THERAPY: ICD-10-CM

## 2022-10-13 DIAGNOSIS — Z98.890 OTHER SPECIFIED POSTPROCEDURAL STATES: ICD-10-CM

## 2022-10-13 DIAGNOSIS — Z95.1 PRESENCE OF AORTOCORONARY BYPASS GRAFT: ICD-10-CM

## 2022-10-13 DIAGNOSIS — S81.811D LACERATION WITHOUT FOREIGN BODY, RIGHT LOWER LEG, SUBSEQUENT ENCOUNTER: ICD-10-CM

## 2022-10-13 DIAGNOSIS — I48.91 UNSPECIFIED ATRIAL FIBRILLATION: ICD-10-CM
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
## 2022-10-13 DIAGNOSIS — I11.9 HYPERTENSIVE HEART DISEASE WITHOUT HEART FAILURE: ICD-10-CM

## 2022-10-13 DIAGNOSIS — N40.0 BENIGN PROSTATIC HYPERPLASIA WITHOUT LOWER URINARY TRACT SYMPTOMS: ICD-10-CM

## 2022-10-18 ENCOUNTER — OUTPATIENT (OUTPATIENT)
Dept: OUTPATIENT SERVICES | Facility: HOSPITAL | Age: 67
LOS: 1 days | Discharge: HOME | End: 2022-10-18
Payer: MEDICARE

## 2022-10-18 ENCOUNTER — OUTPATIENT (OUTPATIENT)
Dept: OUTPATIENT SERVICES | Facility: HOSPITAL | Age: 67
LOS: 1 days | End: 2022-10-18

## 2022-10-18 ENCOUNTER — APPOINTMENT (OUTPATIENT)
Dept: WOUND CARE | Facility: HOSPITAL | Age: 67
End: 2022-10-18

## 2022-10-18 VITALS
DIASTOLIC BLOOD PRESSURE: 88 MMHG | RESPIRATION RATE: 20 BRPM | TEMPERATURE: 97.9 F | SYSTOLIC BLOOD PRESSURE: 202 MMHG | HEART RATE: 45 BPM | HEIGHT: 69 IN | WEIGHT: 209 LBS | BODY MASS INDEX: 30.96 KG/M2 | OXYGEN SATURATION: 96 %

## 2022-10-18 VITALS — DIASTOLIC BLOOD PRESSURE: 95 MMHG | SYSTOLIC BLOOD PRESSURE: 192 MMHG

## 2022-10-18 DIAGNOSIS — S81.811D LACERATION WITHOUT FOREIGN BODY, RIGHT LOWER LEG, SUBSEQUENT ENCOUNTER: ICD-10-CM

## 2022-10-18 DIAGNOSIS — L97.801 NON-PRESSURE CHRONIC ULCER OF OTHER PART OF UNSPECIFIED LOWER LEG LIMITED TO BREAKDOWN OF SKIN: ICD-10-CM

## 2022-10-18 PROCEDURE — 93970 EXTREMITY STUDY: CPT

## 2022-10-18 PROCEDURE — G0463: CPT | Mod: 25

## 2022-10-18 PROCEDURE — 99213 OFFICE O/P EST LOW 20 MIN: CPT

## 2022-10-18 PROCEDURE — 93970 EXTREMITY STUDY: CPT | Mod: 26

## 2022-10-18 NOTE — PLAN
[FreeTextEntry1] : patient had vascular studies\par lower leg and ankle edema noted\par wound has slough, but is smaller\par medihoney with DD and ace wraps started\par not sign of infection noted\par return 1 weeks\par stressed leg elevation\par stressed need to schedule venous studies\par 20 minutes spent in review,evaluation and treatment planning

## 2022-10-18 NOTE — ASSESSMENT
[Verbal] : Verbal [Written] : Written [Patient] : Patient [Good - alert, interested, motivated] : Good - alert, interested, motivated [Demonstrates independently] : demonstrates independently [Stable] : stable [Home] : Home [Ambulatory] : Ambulatory [Dressing changes] : dressing changes [Skin Care] : skin care [Signs and symptoms of infection] : sign and symptoms of infection [Venous Disease] : venous disease [How and When to Call] : how and when to call [Labs and Tests] : labs and tests [Compression Therapy] : compression therapy [Patient responsibility to plan of care] : patient responsibility to plan of care [] : No [FreeTextEntry2] : Infection prevention\par Compression therapy\par nutrition and wound healing\par Elevation and low sodium compliance\par Vascular [FreeTextEntry4] : Patient obtained vascular testing, follow up with Vascular to review results\par Patient to F/U in 1 week

## 2022-10-18 NOTE — PHYSICAL EXAM
[2 x 2] : 2 x 2  [Normal Breath Sounds] : Normal breath sounds [Normal Rate and Rhythm] : normal rate and rhythm [0] : left 0 [2+] : left 2+ [Ankle Swelling (On Exam)] : present [Ankle Swelling Bilaterally] : bilaterally  [Ankle Swelling On The Left] : moderate [Alert] : alert [Oriented to Person] : oriented to person [Oriented to Place] : oriented to place [Oriented to Time] : oriented to time [Calm] : calm [Varicose Veins Of Lower Extremities] : not present [] : not present [de-identified] : WN WD 66 Y/O white male in NAD [de-identified] : TRE PRASADM intact [de-identified] : supple [de-identified] : 2 small residual scabs/eschar right calf [FreeTextEntry1] : Right Posterior Lower Leg [FreeTextEntry2] : 0.6 [FreeTextEntry3] : 0.6 [FreeTextEntry4] : 0.1 [de-identified] : Small serous [de-identified] : none [de-identified] : 70% [de-identified] : 30% [de-identified] : Pt expressed comfort post ACE application. Circ/Neuromuscular functions WNL post application. [de-identified] : Medihoney [de-identified] : Mechanically cleansed with sterile gauze and normal saline 0.9%\par Dry Dressing\par  [TWNoteComboBox5] : No [TWNoteComboBox6] : Other [de-identified] : No [de-identified] : Normal [de-identified] : None [de-identified] : Ace wraps [de-identified] : Every other day [de-identified] : Primary Dressing

## 2022-10-18 NOTE — REVIEW OF SYSTEMS
[Negative] : Heme/Lymph [FreeTextEntry5] : hypertension, CAD ,AFib [FreeTextEntry8] : BPH [de-identified] : wound right calf

## 2022-10-18 NOTE — VITALS
[Pain related to present condition?] : The patient's  pain is related to present condition. [Sharp] : sharp [Throbbing] : throbbing [] : No [de-identified] : 7.5/10 [FreeTextEntry3] : wound [FreeTextEntry1] : patient states nothing improves pain [FreeTextEntry2] : palpating [FreeTextEntry4] : elevated leg

## 2022-10-18 NOTE — HISTORY OF PRESENT ILLNESS
[FreeTextEntry1] : Patient presents with a traumatic wound on his right lower posterior leg.  Patient reports that in mid August 2022 he was on a baseball field and  his leg became itchy from gnat bites.  Patient scratched the area causing a wound.  Patient was seen by his PCP (8/22/22) and was prescribed oral antibiotic.  Patient reports that he completed the antibiotic and when he returned to PCP  (9/1/22) he was prescribed a second antibiotic which he completed. Patient does not recall the name.   Patient reports that he is not applying topical medication or dressing to his wound and was advised warm compresses as ordered by his PCP   Patient presents with right lower leg edema and reports that he went to PCP office yesterday and was given a script for doppler to r/o DVT.  Patient has an appointment to have doppler at [facility] this afternoon.  Patient was a patient at [facility] in 2019 and decided to return to [facility] for treatment of wound.\par \par 9/27/22 area right posterior calf healed in one spot and eschar off with small opened spot\par 10/11/22 wound larger probably  secondary to edema

## 2022-10-20 DIAGNOSIS — N40.0 BENIGN PROSTATIC HYPERPLASIA WITHOUT LOWER URINARY TRACT SYMPTOMS: ICD-10-CM
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
## 2022-10-20 DIAGNOSIS — Z83.3 FAMILY HISTORY OF DIABETES MELLITUS: ICD-10-CM
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
## 2022-10-20 DIAGNOSIS — I48.91 UNSPECIFIED ATRIAL FIBRILLATION: ICD-10-CM

## 2022-10-20 DIAGNOSIS — S81.811D LACERATION WITHOUT FOREIGN BODY, RIGHT LOWER LEG, SUBSEQUENT ENCOUNTER: ICD-10-CM
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
## 2022-10-20 DIAGNOSIS — Z95.1 PRESENCE OF AORTOCORONARY BYPASS GRAFT: ICD-10-CM

## 2022-10-20 DIAGNOSIS — I11.9 HYPERTENSIVE HEART DISEASE WITHOUT HEART FAILURE: ICD-10-CM

## 2022-10-20 DIAGNOSIS — Z98.890 OTHER SPECIFIED POSTPROCEDURAL STATES: ICD-10-CM

## 2022-10-20 DIAGNOSIS — Z87.891 PERSONAL HISTORY OF NICOTINE DEPENDENCE: ICD-10-CM

## 2022-10-20 DIAGNOSIS — Z79.899 OTHER LONG TERM (CURRENT) DRUG THERAPY: ICD-10-CM

## 2022-10-24 ENCOUNTER — APPOINTMENT (OUTPATIENT)
Dept: WOUND CARE | Facility: HOSPITAL | Age: 67
End: 2022-10-24

## 2022-10-24 ENCOUNTER — OUTPATIENT (OUTPATIENT)
Dept: OUTPATIENT SERVICES | Facility: HOSPITAL | Age: 67
LOS: 1 days | Discharge: HOME | End: 2022-10-24
Payer: MEDICARE

## 2022-10-24 VITALS
DIASTOLIC BLOOD PRESSURE: 76 MMHG | HEIGHT: 69 IN | RESPIRATION RATE: 20 BRPM | WEIGHT: 209 LBS | HEART RATE: 72 BPM | SYSTOLIC BLOOD PRESSURE: 186 MMHG | TEMPERATURE: 98.8 F | BODY MASS INDEX: 30.96 KG/M2 | OXYGEN SATURATION: 97 %

## 2022-10-24 DIAGNOSIS — T14.8XXA OTHER INJURY OF UNSPECIFIED BODY REGION, INITIAL ENCOUNTER: ICD-10-CM

## 2022-10-24 DIAGNOSIS — S81.812D LACERATION WITHOUT FOREIGN BODY, LEFT LOWER LEG, SUBSEQUENT ENCOUNTER: ICD-10-CM

## 2022-10-24 DIAGNOSIS — Z95.1 PRESENCE OF AORTOCORONARY BYPASS GRAFT: ICD-10-CM

## 2022-10-24 DIAGNOSIS — L08.9 OTHER INJURY OF UNSPECIFIED BODY REGION, INITIAL ENCOUNTER: ICD-10-CM

## 2022-10-24 DIAGNOSIS — I10 ESSENTIAL (PRIMARY) HYPERTENSION: ICD-10-CM
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
## 2022-10-24 DIAGNOSIS — Z83.3 FAMILY HISTORY OF DIABETES MELLITUS: ICD-10-CM

## 2022-10-24 DIAGNOSIS — S81.812D LACERATION W/OUT FOREIGN BODY, LEFT LOWER LEG, SUBSEQUENT ENCOUNTER: ICD-10-CM

## 2022-10-24 DIAGNOSIS — S81.811D LACERATION WITHOUT FOREIGN BODY, RIGHT LOWER LEG, SUBSEQUENT ENCOUNTER: ICD-10-CM

## 2022-10-24 DIAGNOSIS — Z98.890 OTHER SPECIFIED POSTPROCEDURAL STATES: ICD-10-CM

## 2022-10-24 DIAGNOSIS — Z87.891 PERSONAL HISTORY OF NICOTINE DEPENDENCE: ICD-10-CM

## 2022-10-24 DIAGNOSIS — I48.91 UNSPECIFIED ATRIAL FIBRILLATION: ICD-10-CM

## 2022-10-24 DIAGNOSIS — N40.0 BENIGN PROSTATIC HYPERPLASIA WITHOUT LOWER URINARY TRACT SYMPTOMS: ICD-10-CM
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
## 2022-10-24 DIAGNOSIS — Z79.899 OTHER LONG TERM (CURRENT) DRUG THERAPY: ICD-10-CM

## 2022-10-24 PROCEDURE — 87070 CULTURE OTHR SPECIMN AEROBIC: CPT

## 2022-10-24 PROCEDURE — G0463: CPT

## 2022-10-24 PROCEDURE — 99213 OFFICE O/P EST LOW 20 MIN: CPT

## 2022-10-24 RX ORDER — AMLODIPINE BESYLATE 10 MG/1
10 TABLET ORAL
Qty: 90 | Refills: 0 | Status: ACTIVE | COMMUNITY
Start: 2022-10-19

## 2022-10-24 NOTE — PHYSICAL EXAM
[2+] : right 2+ [Ankle Swelling (On Exam)] : not present [Varicose Veins Of Lower Extremities] : bilaterally [Ankle Swelling On The Left] : moderate [] : not present [FreeTextEntry1] : palpable pulses [de-identified] : R posterior calf 2 cm necrotic wound with surrounding erythema tender

## 2022-10-24 NOTE — HISTORY OF PRESENT ILLNESS
[FreeTextEntry1] : Patient presents with a traumatic wound on his right lower posterior leg. Patient reports that in mid August 2022 he was on a baseball field and his leg became itchy from gnat bites. Patient scratched the area causing a wound. Patient was seen by his PCP (8/22/22) and was prescribed oral antibiotic. Patient reports that he completed the antibiotic and when he returned to PCP (9/1/22) he was prescribed a second antibiotic which he completed. Patient does not recall the name. Patient reports that he is not applying topical medication or dressing to his wound and was advised warm compresses as ordered by his PCP Patient presents with right lower leg edema and reports that he went to PCP office yesterday and was given a script for doppler to r/o DVT. Patient has an appointment to have doppler at [facility] this afternoon. Patient was a patient at [facility] in 2019 and decided to return to [facility] for treatment of wound. \par \par He is here today for vascular eval. Wound seems to be larger and more tender.

## 2022-10-24 NOTE — ASSESSMENT
[FreeTextEntry1] : No evidence of PVD or venous insuf. Wound and mechanism are consistent with necrotic insect bite with over infection.

## 2022-10-25 ENCOUNTER — APPOINTMENT (OUTPATIENT)
Dept: WOUND CARE | Facility: HOSPITAL | Age: 67
End: 2022-10-25

## 2022-10-26 LAB
CULTURE RESULTS: SIGNIFICANT CHANGE UP
SPECIMEN SOURCE: SIGNIFICANT CHANGE UP

## 2022-10-31 ENCOUNTER — APPOINTMENT (OUTPATIENT)
Dept: WOUND CARE | Facility: HOSPITAL | Age: 67
End: 2022-10-31

## 2022-10-31 ENCOUNTER — OUTPATIENT (OUTPATIENT)
Dept: OUTPATIENT SERVICES | Facility: HOSPITAL | Age: 67
LOS: 1 days | Discharge: HOME | End: 2022-10-31
Payer: MEDICARE

## 2022-10-31 VITALS
RESPIRATION RATE: 20 BRPM | HEIGHT: 69 IN | DIASTOLIC BLOOD PRESSURE: 75 MMHG | SYSTOLIC BLOOD PRESSURE: 166 MMHG | BODY MASS INDEX: 30.96 KG/M2 | HEART RATE: 66 BPM | TEMPERATURE: 98.4 F | WEIGHT: 209 LBS | OXYGEN SATURATION: 95 %

## 2022-10-31 DIAGNOSIS — L97.801 NON-PRESSURE CHRONIC ULCER OF OTHER PART OF UNSPECIFIED LOWER LEG LIMITED TO BREAKDOWN OF SKIN: ICD-10-CM

## 2022-10-31 PROCEDURE — G0463: CPT

## 2022-10-31 PROCEDURE — 99213 OFFICE O/P EST LOW 20 MIN: CPT

## 2022-11-01 DIAGNOSIS — Z83.3 FAMILY HISTORY OF DIABETES MELLITUS: ICD-10-CM

## 2022-11-01 DIAGNOSIS — I48.91 UNSPECIFIED ATRIAL FIBRILLATION: ICD-10-CM
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
## 2022-11-01 DIAGNOSIS — I25.10 ATHEROSCLEROTIC HEART DISEASE OF NATIVE CORONARY ARTERY WITHOUT ANGINA PECTORIS: ICD-10-CM
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
## 2022-11-01 DIAGNOSIS — Z98.890 OTHER SPECIFIED POSTPROCEDURAL STATES: ICD-10-CM

## 2022-11-01 DIAGNOSIS — Z95.1 PRESENCE OF AORTOCORONARY BYPASS GRAFT: ICD-10-CM

## 2022-11-01 DIAGNOSIS — Z79.899 OTHER LONG TERM (CURRENT) DRUG THERAPY: ICD-10-CM

## 2022-11-01 DIAGNOSIS — N40.0 BENIGN PROSTATIC HYPERPLASIA WITHOUT LOWER URINARY TRACT SYMPTOMS: ICD-10-CM

## 2022-11-01 DIAGNOSIS — S81.811D LACERATION WITHOUT FOREIGN BODY, RIGHT LOWER LEG, SUBSEQUENT ENCOUNTER: ICD-10-CM
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
## 2022-11-01 DIAGNOSIS — Z87.891 PERSONAL HISTORY OF NICOTINE DEPENDENCE: ICD-10-CM

## 2022-11-01 DIAGNOSIS — I10 ESSENTIAL (PRIMARY) HYPERTENSION: ICD-10-CM

## 2022-11-02 NOTE — ASSESSMENT
[Verbal] : Verbal [Written] : Written [Patient] : Patient [Good - alert, interested, motivated] : Good - alert, interested, motivated [Demonstrates independently] : demonstrates independently [Dressing changes] : dressing changes [Skin Care] : skin care [Signs and symptoms of infection] : sign and symptoms of infection [Venous Disease] : venous disease [How and When to Call] : how and when to call [Labs and Tests] : labs and tests [Compression Therapy] : compression therapy [Patient responsibility to plan of care] : patient responsibility to plan of care [Stable] : stable [Home] : Home [Ambulatory] : Ambulatory [] : No [FreeTextEntry2] : Infection prevention\par Compression therapy\par nutrition and wound healing\par Elevation and low sodium compliance\par Vascular [FreeTextEntry4] : No s/s of infection noted.\par F/U in 2 weeks\par Pt saw vascular surgeon, no intervention at this time

## 2022-11-02 NOTE — REVIEW OF SYSTEMS
[Negative] : Heme/Lymph [FreeTextEntry5] : hypertension, CAD ,AFib [FreeTextEntry8] : BPH [de-identified] : wound right calf

## 2022-11-02 NOTE — HISTORY OF PRESENT ILLNESS
[FreeTextEntry1] : Patient presents with a traumatic wound on his right lower posterior leg.  Patient reports that in mid August 2022 he was on a baseball field and  his leg became itchy from gnat bites.  Patient scratched the area causing a wound.  Patient was seen by his PCP (8/22/22) and was prescribed oral antibiotic.  Patient reports that he completed the antibiotic and when he returned to PCP  (9/1/22) he was prescribed a second antibiotic which he completed. Patient does not recall the name.   Patient reports that he is not applying topical medication or dressing to his wound and was advised warm compresses as ordered by his PCP   Patient presents with right lower leg edema and reports that he went to PCP office yesterday and was given a script for doppler to r/o DVT.  Patient has an appointment to have doppler at [facility] this afternoon.  Patient was a patient at [facility] in 2019 and decided to return to [facility] for treatment of wound.\par \par 9/27/22 area right posterior calf healed in one spot and eschar off with small opened spot\par 10/11/22 wound larger probably  secondary to edema

## 2022-11-02 NOTE — PLAN
[FreeTextEntry1] : patient had vascular studies\par Saw Dr Pineda last week and just finished a course of antibiotics she put him on\par lower leg and ankle edema noted to be much improved\par wound has less slough, but is smaller\par medihoney with DD and ace wraps started\par not sign of infection noted\par return 2 weeks\par stressed leg elevation\par 20 minutes spent in review,evaluation and treatment planning

## 2022-11-02 NOTE — PHYSICAL EXAM
[2 x 2] : 2 x 2  [Normal Breath Sounds] : Normal breath sounds [Normal Rate and Rhythm] : normal rate and rhythm [0] : left 0 [2+] : left 2+ [Ankle Swelling (On Exam)] : present [Ankle Swelling Bilaterally] : bilaterally  [Ankle Swelling On The Left] : moderate [Alert] : alert [Oriented to Person] : oriented to person [Oriented to Place] : oriented to place [Oriented to Time] : oriented to time [Calm] : calm [Varicose Veins Of Lower Extremities] : not present [] : not present [de-identified] : WN WD 68 Y/O white male in NAD [de-identified] : TRE PRASADM intact [de-identified] : supple [de-identified] : 2 small residual scabs/eschar right calf [FreeTextEntry1] :  Posterior Lower Leg [FreeTextEntry2] : 1.2 [FreeTextEntry3] : 1.2 [FreeTextEntry4] : 0.1 [de-identified] : Small serous [de-identified] : none [de-identified] : 70% [de-identified] : 30% [de-identified] : own compression stockings. [de-identified] : Medihoney [de-identified] : Mechanically cleansed with sterile gauze and normal saline 0.9%\par \par  [TWNoteComboBox1] : Right [TWNoteComboBox5] : No [TWNoteComboBox6] : Other [de-identified] : No [de-identified] : Normal [de-identified] : None [de-identified] : Other [de-identified] : 3x Weekly [de-identified] : Primary Dressing

## 2022-11-14 ENCOUNTER — APPOINTMENT (OUTPATIENT)
Dept: WOUND CARE | Facility: HOSPITAL | Age: 67
End: 2022-11-14

## 2022-11-20 ENCOUNTER — NON-APPOINTMENT (OUTPATIENT)
Age: 67
End: 2022-11-20

## 2022-11-21 ENCOUNTER — OUTPATIENT (OUTPATIENT)
Dept: OUTPATIENT SERVICES | Facility: HOSPITAL | Age: 67
LOS: 1 days | Discharge: HOME | End: 2022-11-21
Payer: MEDICARE

## 2022-11-21 ENCOUNTER — APPOINTMENT (OUTPATIENT)
Dept: WOUND CARE | Facility: HOSPITAL | Age: 67
End: 2022-11-21

## 2022-11-21 VITALS
RESPIRATION RATE: 20 BRPM | HEIGHT: 69 IN | WEIGHT: 209 LBS | BODY MASS INDEX: 30.96 KG/M2 | OXYGEN SATURATION: 95 % | TEMPERATURE: 98.3 F | HEART RATE: 51 BPM | DIASTOLIC BLOOD PRESSURE: 74 MMHG | SYSTOLIC BLOOD PRESSURE: 155 MMHG

## 2022-11-21 DIAGNOSIS — L97.801 NON-PRESSURE CHRONIC ULCER OF OTHER PART OF UNSPECIFIED LOWER LEG LIMITED TO BREAKDOWN OF SKIN: ICD-10-CM

## 2022-11-21 PROCEDURE — 99213 OFFICE O/P EST LOW 20 MIN: CPT

## 2022-11-21 PROCEDURE — G0463: CPT

## 2022-11-22 DIAGNOSIS — I25.10 ATHEROSCLEROTIC HEART DISEASE OF NATIVE CORONARY ARTERY WITHOUT ANGINA PECTORIS: ICD-10-CM

## 2022-11-22 DIAGNOSIS — S81.811D LACERATION WITHOUT FOREIGN BODY, RIGHT LOWER LEG, SUBSEQUENT ENCOUNTER: ICD-10-CM
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
## 2022-11-22 DIAGNOSIS — I48.91 UNSPECIFIED ATRIAL FIBRILLATION: ICD-10-CM
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
## 2022-11-22 DIAGNOSIS — N40.0 BENIGN PROSTATIC HYPERPLASIA WITHOUT LOWER URINARY TRACT SYMPTOMS: ICD-10-CM

## 2022-11-22 DIAGNOSIS — Z87.891 PERSONAL HISTORY OF NICOTINE DEPENDENCE: ICD-10-CM

## 2022-11-22 DIAGNOSIS — Z98.890 OTHER SPECIFIED POSTPROCEDURAL STATES: ICD-10-CM

## 2022-11-22 DIAGNOSIS — I10 ESSENTIAL (PRIMARY) HYPERTENSION: ICD-10-CM

## 2022-11-22 DIAGNOSIS — Z83.3 FAMILY HISTORY OF DIABETES MELLITUS: ICD-10-CM

## 2022-11-22 DIAGNOSIS — Z79.899 OTHER LONG TERM (CURRENT) DRUG THERAPY: ICD-10-CM

## 2022-11-22 DIAGNOSIS — Z95.1 PRESENCE OF AORTOCORONARY BYPASS GRAFT: ICD-10-CM
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
## 2022-11-22 NOTE — ASSESSMENT
[Verbal] : Verbal [Written] : Written [Demo] : Demo [Patient] : Patient [Good - alert, interested, motivated] : Good - alert, interested, motivated [Demonstrates independently] : demonstrates independently [Dressing changes] : dressing changes [Skin Care] : skin care [Signs and symptoms of infection] : sign and symptoms of infection [Nutrition] : nutrition [How and When to Call] : how and when to call [Compression Therapy] : compression therapy [Patient responsibility to plan of care] : patient responsibility to plan of care [Stable] : stable [Home] : Home [Ambulatory] : Ambulatory [Not Applicable - Long Term Care/Home Health Agency] : Long Term Care/Home Health Agency: Not Applicable [] : No [FreeTextEntry2] : Infection prevention\par Compression therapy\par nutrition and wound healing\par Elevation and low sodium compliance\par  [FreeTextEntry3] : wound dimensions have increased [FreeTextEntry4] : F/U 1 week for assessment\par Pt was admitted to [facility] on 11/14/22 related to a seizure. Pt wife stated labs and MRI were done and hx of several strokes were noted, pt to follow up with Dr. Jensen (Neurologist) next week, MD aware. Pt advised to bring in copy of MRI and hospital report, understanding verbalized. [FreeTextEntry1] : PT with posterior RLE wound.  Wound unchanged in size for a few months.  PT has been on multiple therapies. Last therapy is medihoney.  Wound not increasing in size, but not improving.  As per wife, while on muciprocin in almost healed but returned.

## 2022-11-22 NOTE — PHYSICAL EXAM
[4 x 4] : 4 x 4  [FreeTextEntry1] :  Posterior Lower Leg [FreeTextEntry2] : 1.4 [FreeTextEntry3] : 1.4 [FreeTextEntry4] : 0.1 [de-identified] : Small serous [de-identified] : none [de-identified] : 1-25% [de-identified] : own compression stockings. [de-identified] : ahsan [de-identified] : Mechanically cleansed with sterile gauze and normal saline 0.9%\par \par  [TWNoteComboBox1] : Right [TWNoteComboBox5] : No [TWNoteComboBox6] : Other [de-identified] : No [de-identified] : Normal [de-identified] : None [de-identified] : >75% [de-identified] : Yes [de-identified] : Other [de-identified] : 3x Weekly [de-identified] : Primary Dressing

## 2022-11-22 NOTE — HISTORY OF PRESENT ILLNESS
[FreeTextEntry1] : PT presents with RLE posterior wound, no complaints. Recent admission to OSH for seizure, being workup for and strokes.  No complaints\par

## 2022-11-22 NOTE — VITALS
[] : No [de-identified] : 0/10 [FreeTextEntry5] : patient was started on hydrochlorothiazide 25 mg PO daily, aspirin 81 mg PO daily, and potassium supplement PO daily

## 2022-11-22 NOTE — PLAN
[FreeTextEntry1] : ahsan with dry dressing.  change every 3 days\par f/u 1 week for evaluation \par \par \par 25 minutes taken including chart review, examination and education

## 2022-11-29 ENCOUNTER — OUTPATIENT (OUTPATIENT)
Dept: OUTPATIENT SERVICES | Facility: HOSPITAL | Age: 67
LOS: 1 days | Discharge: HOME | End: 2022-11-29
Payer: MEDICARE

## 2022-11-29 ENCOUNTER — APPOINTMENT (OUTPATIENT)
Dept: WOUND CARE | Facility: HOSPITAL | Age: 67
End: 2022-11-29

## 2022-11-29 VITALS
HEIGHT: 69 IN | HEART RATE: 58 BPM | RESPIRATION RATE: 20 BRPM | SYSTOLIC BLOOD PRESSURE: 146 MMHG | WEIGHT: 209 LBS | DIASTOLIC BLOOD PRESSURE: 92 MMHG | BODY MASS INDEX: 30.96 KG/M2 | TEMPERATURE: 97.9 F | OXYGEN SATURATION: 95 %

## 2022-11-29 DIAGNOSIS — S81.811D LACERATION W/OUT FOREIGN BODY, RIGHT LOWER LEG, SUBSEQUENT ENCOUNTER: ICD-10-CM

## 2022-11-29 DIAGNOSIS — I68.0 ORGAN-LIMITED AMYLOIDOSIS: ICD-10-CM

## 2022-11-29 DIAGNOSIS — S81.811D LACERATION WITHOUT FOREIGN BODY, RIGHT LOWER LEG, SUBSEQUENT ENCOUNTER: ICD-10-CM

## 2022-11-29 DIAGNOSIS — E85.4 ORGAN-LIMITED AMYLOIDOSIS: ICD-10-CM

## 2022-11-29 PROCEDURE — 99213 OFFICE O/P EST LOW 20 MIN: CPT

## 2022-11-29 PROCEDURE — 97602 WOUND(S) CARE NON-SELECTIVE: CPT

## 2022-11-29 RX ORDER — RIVAROXABAN 20 MG/1
20 TABLET, FILM COATED ORAL
Refills: 0 | Status: DISCONTINUED | COMMUNITY
End: 2022-11-29

## 2022-11-30 NOTE — REVIEW OF SYSTEMS
[Negative] : Heme/Lymph [FreeTextEntry5] : hypertension, CAD ,AFib [FreeTextEntry8] : BPH [de-identified] : wound right calf

## 2022-11-30 NOTE — ASSESSMENT
[Verbal] : Verbal [Written] : Written [Demo] : Demo [Patient] : Patient [Good - alert, interested, motivated] : Good - alert, interested, motivated [Demonstrates independently] : demonstrates independently [Dressing changes] : dressing changes [Skin Care] : skin care [Signs and symptoms of infection] : sign and symptoms of infection [Nutrition] : nutrition [How and When to Call] : how and when to call [Compression Therapy] : compression therapy [Patient responsibility to plan of care] : patient responsibility to plan of care [Stable] : stable [Home] : Home [Ambulatory] : Ambulatory [Not Applicable - Long Term Care/Home Health Agency] : Long Term Care/Home Health Agency: Not Applicable [] : No [FreeTextEntry2] : Infection prevention\par Compression therapy\par nutrition and wound healing\par Elevation and low sodium compliance\par  [FreeTextEntry3] : wound remains the same [FreeTextEntry4] : F/U 2 weeks for assessment\par Rx for collagenase sent to pharmacy\par Preauth for sharps debridement submitted\par Pt visited Neurologist Dr. Jensen regarding recent seizure history and noted strokes on MRI. Pt wife stated EEG was performed with abnormal results, pt diagnosed w/ cerebral amyloid angiopathy, xarelto d/c'd due to high risk of cerebral hemorrhaging. Pt to see Dr. Pro Gonzales (cardiologist at [facility]) for loop recorder placement and eventual watchmen procedure due to A-fib, Dr. Pritchett made aware.

## 2022-11-30 NOTE — PLAN
[FreeTextEntry1] : collagenase QD DD right calf wound\par wound has less slough,\par no sign of infection noted\par return 2 weeks\par authorization for debridement, for possible debridement\par stressed leg elevation\par 20 minutes spent in review,evaluation and treatment planning

## 2022-11-30 NOTE — PHYSICAL EXAM
[Normal Breath Sounds] : Normal breath sounds [Normal Rate and Rhythm] : normal rate and rhythm [0] : left 0 [2+] : left 2+ [Ankle Swelling (On Exam)] : present [Ankle Swelling Bilaterally] : bilaterally  [Ankle Swelling On The Left] : moderate [Alert] : alert [Oriented to Person] : oriented to person [Oriented to Place] : oriented to place [Oriented to Time] : oriented to time [Calm] : calm [2 x 2] : 2 x 2  [Varicose Veins Of Lower Extremities] : not present [] : not present [de-identified] : WN WD 66 Y/O white male in NAD [de-identified] : TRE PRASADM intact [de-identified] : supple [de-identified] : 2 small residual scabs/eschar right calf [FreeTextEntry1] :  Posterior Lower Leg [FreeTextEntry2] : 1.4 [FreeTextEntry3] : 1.4 [FreeTextEntry4] : 0.1 [de-identified] : Small serous [de-identified] : none [de-identified] : >75% [de-identified] : own compression stockings. [de-identified] : collagenase [de-identified] : Mechanically cleansed with sterile gauze and normal saline 0.9%\par \par Tegaderm\par  [TWNoteComboBox1] : Right [TWNoteComboBox5] : No [TWNoteComboBox6] : Other [de-identified] : No [de-identified] : Normal [de-identified] : None [de-identified] : <20% [de-identified] : Yes [de-identified] : Other [de-identified] : Daily [de-identified] : Primary Dressing

## 2022-11-30 NOTE — VITALS
[Pain related to present condition?] : The patient's  pain is related to present condition. [] : No [de-identified] : 8/10 sharp pain  [FreeTextEntry3] : right posterior leg [FreeTextEntry1] : Tylenol 500 mg 2 tabs, offloading [FreeTextEntry2] : direct pressure to wound site, prolonged standing [FreeTextEntry4] : offloading/pressure relief [FreeTextEntry5] : D/C Xarelto
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
## 2022-12-01 DIAGNOSIS — N40.0 BENIGN PROSTATIC HYPERPLASIA WITHOUT LOWER URINARY TRACT SYMPTOMS: ICD-10-CM

## 2022-12-01 DIAGNOSIS — I48.91 UNSPECIFIED ATRIAL FIBRILLATION: ICD-10-CM

## 2022-12-01 DIAGNOSIS — Z95.1 PRESENCE OF AORTOCORONARY BYPASS GRAFT: ICD-10-CM
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
## 2022-12-01 DIAGNOSIS — I10 ESSENTIAL (PRIMARY) HYPERTENSION: ICD-10-CM
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
## 2022-12-01 DIAGNOSIS — Z98.890 OTHER SPECIFIED POSTPROCEDURAL STATES: ICD-10-CM

## 2022-12-01 DIAGNOSIS — S81.811A LACERATION WITHOUT FOREIGN BODY, RIGHT LOWER LEG, INITIAL ENCOUNTER: ICD-10-CM

## 2022-12-01 DIAGNOSIS — Z83.3 FAMILY HISTORY OF DIABETES MELLITUS: ICD-10-CM
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
## 2022-12-01 DIAGNOSIS — Z79.899 OTHER LONG TERM (CURRENT) DRUG THERAPY: ICD-10-CM

## 2022-12-01 DIAGNOSIS — Z87.891 PERSONAL HISTORY OF NICOTINE DEPENDENCE: ICD-10-CM

## 2022-12-01 DIAGNOSIS — I25.10 ATHEROSCLEROTIC HEART DISEASE OF NATIVE CORONARY ARTERY WITHOUT ANGINA PECTORIS: ICD-10-CM

## 2022-12-21 ENCOUNTER — APPOINTMENT (OUTPATIENT)
Dept: WOUND CARE | Facility: HOSPITAL | Age: 67
End: 2022-12-21

## 2023-10-09 ENCOUNTER — OUTPATIENT (OUTPATIENT)
Dept: OUTPATIENT SERVICES | Facility: HOSPITAL | Age: 68
LOS: 1 days | Discharge: HOME | End: 2023-10-09
Payer: MEDICARE

## 2023-10-09 ENCOUNTER — APPOINTMENT (OUTPATIENT)
Dept: WOUND CARE | Facility: HOSPITAL | Age: 68
End: 2023-10-09
Payer: MEDICARE

## 2023-10-09 VITALS
DIASTOLIC BLOOD PRESSURE: 75 MMHG | SYSTOLIC BLOOD PRESSURE: 137 MMHG | TEMPERATURE: 97.7 F | BODY MASS INDEX: 30.96 KG/M2 | HEIGHT: 69 IN | RESPIRATION RATE: 20 BRPM | HEART RATE: 54 BPM | WEIGHT: 209 LBS | OXYGEN SATURATION: 96 %

## 2023-10-09 DIAGNOSIS — S81.812A LACERATION W/OUT FOREIGN BODY, LEFT LOWER LEG, INITIAL ENCOUNTER: ICD-10-CM

## 2023-10-09 DIAGNOSIS — L97.801 NON-PRESSURE CHRONIC ULCER OF OTHER PART OF UNSPECIFIED LOWER LEG LIMITED TO BREAKDOWN OF SKIN: ICD-10-CM

## 2023-10-09 PROCEDURE — G0463: CPT

## 2023-10-09 PROCEDURE — 99214 OFFICE O/P EST MOD 30 MIN: CPT

## 2023-10-09 RX ORDER — CEPHALEXIN 500 MG/1
500 CAPSULE ORAL
Qty: 10 | Refills: 0 | Status: DISCONTINUED | COMMUNITY
Start: 2022-10-24 | End: 2023-10-09

## 2023-10-09 RX ORDER — MUPIROCIN 20 MG/G
2 OINTMENT TOPICAL TWICE DAILY
Qty: 22 | Refills: 2 | Status: DISCONTINUED | COMMUNITY
Start: 2022-09-27 | End: 2023-10-09

## 2023-10-09 RX ORDER — MUPIROCIN 20 MG/G
2 OINTMENT TOPICAL DAILY
Qty: 22 | Refills: 1 | Status: ACTIVE | COMMUNITY
Start: 2023-10-09 | End: 1900-01-01

## 2023-10-09 RX ORDER — ASPIRIN AND DIPYRIDAMOLE 25; 200 MG/1; MG/1
25-200 CAPSULE, EXTENDED RELEASE ORAL
Refills: 0 | Status: ACTIVE | COMMUNITY

## 2023-10-09 RX ORDER — LACOSAMIDE 50 MG/1
50 TABLET ORAL
Refills: 0 | Status: ACTIVE | COMMUNITY

## 2023-10-09 RX ORDER — HYDROCHLOROTHIAZIDE 12.5 MG/1
TABLET ORAL
Refills: 0 | Status: ACTIVE | COMMUNITY

## 2023-10-09 RX ORDER — COLLAGENASE SANTYL 250 [ARB'U]/G
250 OINTMENT TOPICAL DAILY
Qty: 1 | Refills: 1 | Status: DISCONTINUED | COMMUNITY
Start: 2022-11-29 | End: 2023-10-09
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
## 2023-10-10 DIAGNOSIS — Z87.891 PERSONAL HISTORY OF NICOTINE DEPENDENCE: ICD-10-CM

## 2023-10-10 DIAGNOSIS — S81.812A LACERATION WITHOUT FOREIGN BODY, LEFT LOWER LEG, INITIAL ENCOUNTER: ICD-10-CM
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
## 2023-10-10 DIAGNOSIS — Z98.890 OTHER SPECIFIED POSTPROCEDURAL STATES: ICD-10-CM

## 2023-10-10 DIAGNOSIS — Z83.3 FAMILY HISTORY OF DIABETES MELLITUS: ICD-10-CM

## 2023-10-10 DIAGNOSIS — I25.10 ATHEROSCLEROTIC HEART DISEASE OF NATIVE CORONARY ARTERY WITHOUT ANGINA PECTORIS: ICD-10-CM
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
## 2023-10-10 DIAGNOSIS — I10 ESSENTIAL (PRIMARY) HYPERTENSION: ICD-10-CM

## 2023-10-10 DIAGNOSIS — Z79.899 OTHER LONG TERM (CURRENT) DRUG THERAPY: ICD-10-CM

## 2023-10-10 DIAGNOSIS — N40.0 BENIGN PROSTATIC HYPERPLASIA WITHOUT LOWER URINARY TRACT SYMPTOMS: ICD-10-CM

## 2023-10-10 DIAGNOSIS — Z95.1 PRESENCE OF AORTOCORONARY BYPASS GRAFT: ICD-10-CM

## 2023-10-10 DIAGNOSIS — I48.91 UNSPECIFIED ATRIAL FIBRILLATION: ICD-10-CM

## 2023-10-16 ENCOUNTER — APPOINTMENT (OUTPATIENT)
Dept: WOUND CARE | Facility: HOSPITAL | Age: 68
End: 2023-10-16

## 2024-10-29 ENCOUNTER — APPOINTMENT (OUTPATIENT)
Dept: OTOLARYNGOLOGY | Facility: CLINIC | Age: 69
End: 2024-10-29
Payer: MEDICARE

## 2024-10-29 VITALS
OXYGEN SATURATION: 96 % | WEIGHT: 208.8 LBS | HEIGHT: 69 IN | HEART RATE: 76 BPM | SYSTOLIC BLOOD PRESSURE: 148 MMHG | BODY MASS INDEX: 30.93 KG/M2 | DIASTOLIC BLOOD PRESSURE: 88 MMHG

## 2024-10-29 DIAGNOSIS — H65.92 UNSPECIFIED NONSUPPURATIVE OTITIS MEDIA, LEFT EAR: ICD-10-CM

## 2024-10-29 DIAGNOSIS — H93.292 OTHER ABNORMAL AUDITORY PERCEPTIONS, LEFT EAR: ICD-10-CM

## 2024-10-29 PROCEDURE — 99203 OFFICE O/P NEW LOW 30 MIN: CPT

## 2024-10-29 RX ORDER — FLUTICASONE PROPIONATE 50 UG/1
50 SPRAY, METERED NASAL TWICE DAILY
Qty: 1 | Refills: 3 | Status: ACTIVE | COMMUNITY
Start: 2024-10-29 | End: 1900-01-01

## 2024-10-29 RX ORDER — SOD CHLOR,BICARB/SQUEEZ BOTTLE
PACKET, WITH RINSE DEVICE NASAL
Qty: 100 | Refills: 1 | Status: ACTIVE | COMMUNITY
Start: 2024-10-29 | End: 1900-01-01

## 2024-11-08 ENCOUNTER — APPOINTMENT (OUTPATIENT)
Dept: OTOLARYNGOLOGY | Facility: CLINIC | Age: 69
End: 2024-11-08

## 2025-01-09 ENCOUNTER — APPOINTMENT (OUTPATIENT)
Dept: OTOLARYNGOLOGY | Facility: CLINIC | Age: 70
End: 2025-01-09

## 2025-04-18 ENCOUNTER — APPOINTMENT (OUTPATIENT)
Dept: OTOLARYNGOLOGY | Facility: CLINIC | Age: 70
End: 2025-04-18
Payer: MEDICARE

## 2025-04-18 VITALS
HEART RATE: 62 BPM | OXYGEN SATURATION: 95 % | DIASTOLIC BLOOD PRESSURE: 69 MMHG | BODY MASS INDEX: 31.1 KG/M2 | HEIGHT: 69 IN | SYSTOLIC BLOOD PRESSURE: 118 MMHG | WEIGHT: 210 LBS

## 2025-04-18 DIAGNOSIS — H69.93 UNSPECIFIED EUSTACHIAN TUBE DISORDER, BILATERAL: ICD-10-CM

## 2025-04-18 DIAGNOSIS — H90.3 SENSORINEURAL HEARING LOSS, BILATERAL: ICD-10-CM

## 2025-04-18 DIAGNOSIS — H65.92 UNSPECIFIED NONSUPPURATIVE OTITIS MEDIA, LEFT EAR: ICD-10-CM

## 2025-04-18 DIAGNOSIS — H93.293 OTHER ABNORMAL AUDITORY PERCEPTIONS, BILATERAL: ICD-10-CM

## 2025-04-18 PROCEDURE — 92557 COMPREHENSIVE HEARING TEST: CPT

## 2025-04-18 PROCEDURE — 92567 TYMPANOMETRY: CPT

## 2025-04-18 PROCEDURE — 99214 OFFICE O/P EST MOD 30 MIN: CPT

## 2025-04-18 RX ORDER — LORAZEPAM 2 MG/1
TABLET ORAL
Refills: 0 | Status: ACTIVE | COMMUNITY

## 2025-04-18 RX ORDER — SOD CHLOR,BICARB/SQUEEZ BOTTLE
PACKET, WITH RINSE DEVICE NASAL
Qty: 100 | Refills: 2 | Status: ACTIVE | COMMUNITY
Start: 2025-04-18 | End: 1900-01-01

## 2025-04-18 RX ORDER — FLUTICASONE PROPIONATE 50 UG/1
50 SPRAY, METERED NASAL TWICE DAILY
Qty: 1 | Refills: 3 | Status: ACTIVE | COMMUNITY
Start: 2025-04-18 | End: 1900-01-01